# Patient Record
Sex: MALE | Race: BLACK OR AFRICAN AMERICAN | NOT HISPANIC OR LATINO | URBAN - METROPOLITAN AREA
[De-identification: names, ages, dates, MRNs, and addresses within clinical notes are randomized per-mention and may not be internally consistent; named-entity substitution may affect disease eponyms.]

---

## 2019-03-01 ENCOUNTER — OUTPATIENT (OUTPATIENT)
Dept: OUTPATIENT SERVICES | Facility: HOSPITAL | Age: 59
LOS: 1 days | End: 2019-03-01
Payer: MEDICAID

## 2019-03-01 VITALS
SYSTOLIC BLOOD PRESSURE: 101 MMHG | WEIGHT: 160.06 LBS | OXYGEN SATURATION: 100 % | RESPIRATION RATE: 16 BRPM | HEIGHT: 67 IN | DIASTOLIC BLOOD PRESSURE: 62 MMHG | HEART RATE: 76 BPM | TEMPERATURE: 99 F

## 2019-03-01 DIAGNOSIS — S72.90XA UNSPECIFIED FRACTURE OF UNSPECIFIED FEMUR, INITIAL ENCOUNTER FOR CLOSED FRACTURE: Chronic | ICD-10-CM

## 2019-03-01 DIAGNOSIS — I10 ESSENTIAL (PRIMARY) HYPERTENSION: ICD-10-CM

## 2019-03-01 DIAGNOSIS — Z99.2 DEPENDENCE ON RENAL DIALYSIS: Chronic | ICD-10-CM

## 2019-03-01 DIAGNOSIS — N18.6 END STAGE RENAL DISEASE: ICD-10-CM

## 2019-03-01 DIAGNOSIS — Z01.818 ENCOUNTER FOR OTHER PREPROCEDURAL EXAMINATION: ICD-10-CM

## 2019-03-01 PROCEDURE — G0463: CPT

## 2019-03-01 PROCEDURE — 87640 STAPH A DNA AMP PROBE: CPT

## 2019-03-01 PROCEDURE — 87641 MR-STAPH DNA AMP PROBE: CPT

## 2019-03-01 NOTE — H&P PST ADULT - MUSCULOSKELETAL
details… detailed exam no joint warmth/no joint erythema/diminished strength/no joint swelling/no calf tenderness/ROM intact

## 2019-03-01 NOTE — H&P PST ADULT - NEGATIVE ALLERGY TYPES
no reactions to insect bites/no reactions to animals/no reactions to medicines/no reactions to food/no outdoor environmental allergies/no indoor environmental allergies

## 2019-03-01 NOTE — H&P PST ADULT - PMH
Benign prostatic hyperplasia    Diabetes    End stage renal disease    Hyperlipidemia    Hypertension    Stroke  x 2

## 2019-03-01 NOTE — H&P PST ADULT - HISTORY OF PRESENT ILLNESS
59 yo male with history of DM, ESRD (on HD TTS) reports the above. Patient states has been diagnosed and been receiving HD approximately 3 months

## 2019-03-01 NOTE — H&P PST ADULT - NEGATIVE ENMT SYMPTOMS
no hearing difficulty/no dry mouth/no ear pain/no vertigo/no tinnitus/no nasal congestion/no nasal discharge/no sinus symptoms

## 2019-03-01 NOTE — H&P PST ADULT - FAMILY HISTORY
Sibling  Still living? Yes, Estimated age: Age Unknown  Family history of diabetes mellitus in brother, Age at diagnosis: Age Unknown  Family history of hypertension in sister, Age at diagnosis: Age Unknown

## 2019-03-02 LAB
MRSA PCR RESULT.: SIGNIFICANT CHANGE UP
S AUREUS DNA NOSE QL NAA+PROBE: SIGNIFICANT CHANGE UP

## 2019-03-23 PROBLEM — N18.6 END STAGE RENAL DISEASE: Chronic | Status: ACTIVE | Noted: 2019-03-01

## 2019-03-23 PROBLEM — E11.9 TYPE 2 DIABETES MELLITUS WITHOUT COMPLICATIONS: Chronic | Status: ACTIVE | Noted: 2019-03-01

## 2019-03-23 PROBLEM — E78.5 HYPERLIPIDEMIA, UNSPECIFIED: Chronic | Status: ACTIVE | Noted: 2019-03-01

## 2019-03-23 PROBLEM — N40.0 BENIGN PROSTATIC HYPERPLASIA WITHOUT LOWER URINARY TRACT SYMPTOMS: Chronic | Status: ACTIVE | Noted: 2019-03-01

## 2019-03-23 PROBLEM — I63.9 CEREBRAL INFARCTION, UNSPECIFIED: Chronic | Status: ACTIVE | Noted: 2019-03-01

## 2019-03-23 PROBLEM — I10 ESSENTIAL (PRIMARY) HYPERTENSION: Chronic | Status: ACTIVE | Noted: 2019-03-01

## 2019-04-09 ENCOUNTER — OUTPATIENT (OUTPATIENT)
Dept: OUTPATIENT SERVICES | Facility: HOSPITAL | Age: 59
LOS: 1 days | End: 2019-04-09
Payer: MEDICAID

## 2019-04-09 VITALS
WEIGHT: 160.06 LBS | HEIGHT: 67 IN | SYSTOLIC BLOOD PRESSURE: 117 MMHG | HEART RATE: 76 BPM | TEMPERATURE: 99 F | RESPIRATION RATE: 16 BRPM | DIASTOLIC BLOOD PRESSURE: 70 MMHG | OXYGEN SATURATION: 100 %

## 2019-04-09 VITALS
SYSTOLIC BLOOD PRESSURE: 109 MMHG | HEART RATE: 74 BPM | DIASTOLIC BLOOD PRESSURE: 75 MMHG | OXYGEN SATURATION: 100 % | TEMPERATURE: 98 F | RESPIRATION RATE: 16 BRPM

## 2019-04-09 DIAGNOSIS — N18.6 END STAGE RENAL DISEASE: ICD-10-CM

## 2019-04-09 DIAGNOSIS — S72.90XA UNSPECIFIED FRACTURE OF UNSPECIFIED FEMUR, INITIAL ENCOUNTER FOR CLOSED FRACTURE: Chronic | ICD-10-CM

## 2019-04-09 DIAGNOSIS — Z99.2 DEPENDENCE ON RENAL DIALYSIS: Chronic | ICD-10-CM

## 2019-04-09 DIAGNOSIS — Z01.818 ENCOUNTER FOR OTHER PREPROCEDURAL EXAMINATION: ICD-10-CM

## 2019-04-09 LAB
ANION GAP SERPL CALC-SCNC: 5 MMOL/L — SIGNIFICANT CHANGE UP (ref 5–17)
BUN SERPL-MCNC: 27 MG/DL — HIGH (ref 7–18)
CALCIUM SERPL-MCNC: 8.3 MG/DL — LOW (ref 8.4–10.5)
CHLORIDE SERPL-SCNC: 102 MMOL/L — SIGNIFICANT CHANGE UP (ref 96–108)
CO2 SERPL-SCNC: 33 MMOL/L — HIGH (ref 22–31)
CREAT SERPL-MCNC: 4.4 MG/DL — HIGH (ref 0.5–1.3)
GLUCOSE BLDC GLUCOMTR-MCNC: 112 MG/DL — HIGH (ref 70–99)
GLUCOSE BLDC GLUCOMTR-MCNC: 57 MG/DL — LOW (ref 70–99)
GLUCOSE BLDC GLUCOMTR-MCNC: 61 MG/DL — LOW (ref 70–99)
GLUCOSE SERPL-MCNC: 99 MG/DL — SIGNIFICANT CHANGE UP (ref 70–99)
POTASSIUM SERPL-MCNC: 4.4 MMOL/L — SIGNIFICANT CHANGE UP (ref 3.5–5.3)
POTASSIUM SERPL-SCNC: 4.4 MMOL/L — SIGNIFICANT CHANGE UP (ref 3.5–5.3)
SODIUM SERPL-SCNC: 140 MMOL/L — SIGNIFICANT CHANGE UP (ref 135–145)

## 2019-04-09 PROCEDURE — 82962 GLUCOSE BLOOD TEST: CPT

## 2019-04-09 PROCEDURE — 93005 ELECTROCARDIOGRAM TRACING: CPT

## 2019-04-09 PROCEDURE — 36819 AV FUSE UPPR ARM BASILIC: CPT | Mod: LT

## 2019-04-09 PROCEDURE — 36415 COLL VENOUS BLD VENIPUNCTURE: CPT

## 2019-04-09 PROCEDURE — 80048 BASIC METABOLIC PNL TOTAL CA: CPT

## 2019-04-09 RX ORDER — ASPIRIN/CALCIUM CARB/MAGNESIUM 324 MG
1 TABLET ORAL
Qty: 0 | Refills: 0 | COMMUNITY

## 2019-04-09 RX ORDER — INSULIN GLARGINE 100 [IU]/ML
20 INJECTION, SOLUTION SUBCUTANEOUS
Qty: 0 | Refills: 0 | COMMUNITY

## 2019-04-09 RX ORDER — FINASTERIDE 5 MG/1
1 TABLET, FILM COATED ORAL
Qty: 0 | Refills: 0 | COMMUNITY

## 2019-04-09 RX ORDER — ATORVASTATIN CALCIUM 80 MG/1
1 TABLET, FILM COATED ORAL
Qty: 0 | Refills: 0 | COMMUNITY

## 2019-04-09 RX ORDER — ONDANSETRON 8 MG/1
4 TABLET, FILM COATED ORAL ONCE
Qty: 0 | Refills: 0 | Status: DISCONTINUED | OUTPATIENT
Start: 2019-04-09 | End: 2019-04-09

## 2019-04-09 RX ORDER — RANITIDINE HYDROCHLORIDE 150 MG/1
1 TABLET, FILM COATED ORAL
Qty: 0 | Refills: 0 | COMMUNITY

## 2019-04-09 RX ORDER — NIFEDIPINE 30 MG
1 TABLET, EXTENDED RELEASE 24 HR ORAL
Qty: 0 | Refills: 0 | COMMUNITY

## 2019-04-09 RX ORDER — TAMSULOSIN HYDROCHLORIDE 0.4 MG/1
1 CAPSULE ORAL
Qty: 0 | Refills: 0 | COMMUNITY

## 2019-04-09 RX ORDER — INSULIN LISPRO 100/ML
2 VIAL (ML) SUBCUTANEOUS
Qty: 0 | Refills: 0 | COMMUNITY

## 2019-04-09 RX ORDER — FENTANYL CITRATE 50 UG/ML
50 INJECTION INTRAVENOUS
Qty: 0 | Refills: 0 | Status: DISCONTINUED | OUTPATIENT
Start: 2019-04-09 | End: 2019-04-09

## 2019-04-09 RX ORDER — GABAPENTIN 400 MG/1
1 CAPSULE ORAL
Qty: 0 | Refills: 0 | COMMUNITY

## 2019-04-09 NOTE — BRIEF OPERATIVE NOTE - OPERATION/FINDINGS
basilic vein transposition and brachial-basilic arteriovenous transposition, palpable thrill and dopplerable radial and ulnar pulses

## 2019-04-09 NOTE — ASU DISCHARGE PLAN (ADULT/PEDIATRIC) - CALL YOUR DOCTOR IF YOU HAVE ANY OF THE FOLLOWING:
Numbness, tingling, color or temperature change to extremity/Wound/Surgical Site with redness, or foul smelling discharge or pus/Pain not relieved by Medications/Bleeding that does not stop/Swelling that gets worse

## 2019-04-09 NOTE — BRIEF OPERATIVE NOTE - NSICDXBRIEFPROCEDURE_GEN_ALL_CORE_FT
PROCEDURES:  Creation of arteriovenous fistula 09-Apr-2019 16:53:09  Lona Rothman  Basilic vein transposition 09-Apr-2019 16:52:50  Lona Rothman

## 2019-04-09 NOTE — ASU PREOP CHECKLIST - SELECT TESTS ORDERED
Date of Service: 09/27/2018    SUPERVISING PHYSICIAN:  Dr. Velarde.      CHIEF COMPLAINT:  Medication followup and Medicare wellness exam.    HISTORY OF PRESENT ILLNESS:  This is an 79-year-old who comes in for his Medicare wellness exam and followup on multiple medical issues.  The patient brought in with him a bottle of Viagra.  He was given a 20 mg dose.  He wanted to talk about how to use it.  He was instructed to take it about an hour before sexual activity, starting with about 3 tablets, max tabs 5.  He was also given information from the [employer] on this medication.  He states the only thing really that is bothering him is he is just really tired and run down.  He states it was the same way when he seen his cardiologist about a month and a half ago, it has not changed since.  His cardiologist dropped his Metoprolol dose down a little bit.  He states this still has not helped.  He is taking 50 mg in the morning and 25 at night.  With this, his blood pressure is pretty low at 118/72, pulse is only 74.  Therefore, we discussed the option of reducing his dose to 25 mg twice a day.  The patient would like his flu shot.  He is also due for a Tetanus shot.  He has had multiple areas that he has scraped, works in the garage still.  He denies any dizziness or lightheadedness.  He denies any chest pain.  He denies any worsening shortness of breath.  He denies any new stomach issues.  His heartburn seems to be really well controlled.  He does not take his Prilosec on a daily basis but takes it when it seems like his heartburn is starting to act up.  This seems to control his symptoms adequately.  His obstructive BPH has improved with Flomax.  He has no new other concerns or questions.    ALLERGIES:  Reviewed.    MEDICATIONS:  Reviewed.    PHYSICAL EXAMINATION:  GENERAL:  The patient is cooperative, pleasant, in no acute distress.  Answers all questions appropriately.  VITAL SIGNS:  Blood pressure 118/72, pulse 74,  BMP/CBC/CMP/POCT Blood Glucose/PT/PTT/INR/EKG/CXR temperature 98.6, pulse ox 97%.  Weight 79.2 kilograms.  Height 5 feet 8 inches.  HEENT:  Pupils are equal and reactive to light.  EOMI.  Sclerae white, anicteric.  Tympanic membranes intact, no erythema or bulge.  Oropharynx:  Moist and clear.  NECK:  Supple.  No significant lymphadenopathy.  No carotid bruits appreciated.  CARDIAC:  Regular rate and rhythm.  RESPIRATORY:  Clear to auscultation.  ABDOMEN:  Soft, nontender.  PERIPHERAL:  No significant edema.    ASSESSMENT:  Medicare wellness exam, fatigue, hypertension, gastroesophageal reflux disease, hyperlipidemia, benign prostatic hypertrophy with obstructive symptoms, improved with Flomax.  History of cardiac catheterization and history of paroxysmal atrial fibrillation.    PLAN:  1.  Continue all current medical management.  2.  Will decrease his Metoprolol from taking 75 mg in a day to 50 mg in a day.  3.  Information given on Viagra.  4.  The patient will receive tetanus and fluid.  5.  CBC, comprehensive, lipid and TSH.  6.  Follow up at least every 6 months, sooner with any questions or concerns.      Dictated By: Camille New PA-C  Signing Provider: ESSENCE Coles/maite ([id number])  DD: 09/27/2018 14:32:28 TD: 09/28/2018 09:05:40    Copy Sent To:

## 2019-04-09 NOTE — ASU DISCHARGE PLAN (ADULT/PEDIATRIC) - CARE PROVIDER_API CALL
Robi Mason (MD)  Surgery; Thoracic Surgery; Vascular Surgery  [address] [facility], [facility] [address]  [place] [address]  Phone: [phone]  Fax: [phone]  Follow Up Time:

## 2021-12-22 NOTE — H&P PST ADULT - NSANTHTIREDRD_ENT_A_CORE
Patient was mailed instructions for Colonoscopy iv sed plavix   on 2/16/22  at [facility].    [facility] will contact patient with arrival time.    Covid test scheduled on 2/13/22  at  9:20 a.m.   [facility]  ([address] [facility])      PLEASE NOTE: Self-quarantine is recommended and minimizes your risk of exposure before procedure.  If you are unable to self-quarantine,  Please continue to wear a mask, practice social distancing and perform good hand hygiene.            
Procedure: colon dx screen  Anes: iv sed  Prep: gavilyte  Diabetic: no  Blood Thinners: plavix ( 5 days); msg to MD       06/2011 colon Dr. Noland    Chart reviewed via EPIC  
Rubi Noland, A Gi Nurse Msg Pool  Patient called to schedule recall colonoscopy.   Please update orders and forward to scheduling pool to contact patient. He can be reached at [phone].     Thank you     
Wally Flaherty, DO 2 minutes ago (11:19 AM)          Yes                Can patient hold Plavix 5 days prior to colonoscopy?     Thanks,     Dr. Noland's office          
No

## 2023-08-18 NOTE — H&P PST ADULT - BLOOD TRANSFUSION, PREVIOUS, PROFILE
Contacted patient to check in to [facility] Phone Appt this morning. LVM for patient to call us back to confirm check-in. no